# Patient Record
Sex: MALE | Race: WHITE | NOT HISPANIC OR LATINO | URBAN - METROPOLITAN AREA
[De-identification: names, ages, dates, MRNs, and addresses within clinical notes are randomized per-mention and may not be internally consistent; named-entity substitution may affect disease eponyms.]

---

## 2020-12-04 ENCOUNTER — INPATIENT (INPATIENT)
Facility: HOSPITAL | Age: 35
LOS: 1 days | Discharge: HOME | DRG: 513 | End: 2020-12-06
Attending: ORTHOPAEDIC SURGERY | Admitting: ORTHOPAEDIC SURGERY
Payer: MEDICAID

## 2020-12-04 VITALS
TEMPERATURE: 98 F | HEART RATE: 81 BPM | DIASTOLIC BLOOD PRESSURE: 71 MMHG | WEIGHT: 220.02 LBS | RESPIRATION RATE: 20 BRPM | OXYGEN SATURATION: 98 % | SYSTOLIC BLOOD PRESSURE: 107 MMHG | HEIGHT: 70 IN

## 2020-12-04 DIAGNOSIS — S61.412A LACERATION WITHOUT FOREIGN BODY OF LEFT HAND, INITIAL ENCOUNTER: ICD-10-CM

## 2020-12-04 LAB
ALBUMIN SERPL ELPH-MCNC: 3.9 G/DL — SIGNIFICANT CHANGE UP (ref 3.3–5.2)
ALP SERPL-CCNC: 57 U/L — SIGNIFICANT CHANGE UP (ref 40–120)
ALT FLD-CCNC: 49 U/L — HIGH
ANION GAP SERPL CALC-SCNC: 9 MMOL/L — SIGNIFICANT CHANGE UP (ref 5–17)
APTT BLD: 28.7 SEC — SIGNIFICANT CHANGE UP (ref 27.5–35.5)
AST SERPL-CCNC: 49 U/L — HIGH
BASOPHILS # BLD AUTO: 0.05 K/UL — SIGNIFICANT CHANGE UP (ref 0–0.2)
BASOPHILS NFR BLD AUTO: 0.4 % — SIGNIFICANT CHANGE UP (ref 0–2)
BILIRUB SERPL-MCNC: 0.6 MG/DL — SIGNIFICANT CHANGE UP (ref 0.4–2)
BUN SERPL-MCNC: 16 MG/DL — SIGNIFICANT CHANGE UP (ref 8–20)
CALCIUM SERPL-MCNC: 8.6 MG/DL — SIGNIFICANT CHANGE UP (ref 8.6–10.2)
CHLORIDE SERPL-SCNC: 105 MMOL/L — SIGNIFICANT CHANGE UP (ref 98–107)
CO2 SERPL-SCNC: 24 MMOL/L — SIGNIFICANT CHANGE UP (ref 22–29)
CREAT SERPL-MCNC: 0.74 MG/DL — SIGNIFICANT CHANGE UP (ref 0.5–1.3)
EOSINOPHIL # BLD AUTO: 0.06 K/UL — SIGNIFICANT CHANGE UP (ref 0–0.5)
EOSINOPHIL NFR BLD AUTO: 0.5 % — SIGNIFICANT CHANGE UP (ref 0–6)
GLUCOSE SERPL-MCNC: 87 MG/DL — SIGNIFICANT CHANGE UP (ref 70–99)
HCT VFR BLD CALC: 40.8 % — SIGNIFICANT CHANGE UP (ref 39–50)
HGB BLD-MCNC: 13.5 G/DL — SIGNIFICANT CHANGE UP (ref 13–17)
IMM GRANULOCYTES NFR BLD AUTO: 0.4 % — SIGNIFICANT CHANGE UP (ref 0–1.5)
INR BLD: 1.15 RATIO — SIGNIFICANT CHANGE UP (ref 0.88–1.16)
LYMPHOCYTES # BLD AUTO: 1.23 K/UL — SIGNIFICANT CHANGE UP (ref 1–3.3)
LYMPHOCYTES # BLD AUTO: 10.3 % — LOW (ref 13–44)
MCHC RBC-ENTMCNC: 29.9 PG — SIGNIFICANT CHANGE UP (ref 27–34)
MCHC RBC-ENTMCNC: 33.1 GM/DL — SIGNIFICANT CHANGE UP (ref 32–36)
MCV RBC AUTO: 90.3 FL — SIGNIFICANT CHANGE UP (ref 80–100)
MONOCYTES # BLD AUTO: 0.74 K/UL — SIGNIFICANT CHANGE UP (ref 0–0.9)
MONOCYTES NFR BLD AUTO: 6.2 % — SIGNIFICANT CHANGE UP (ref 2–14)
NEUTROPHILS # BLD AUTO: 9.8 K/UL — HIGH (ref 1.8–7.4)
NEUTROPHILS NFR BLD AUTO: 82.2 % — HIGH (ref 43–77)
PLATELET # BLD AUTO: 278 K/UL — SIGNIFICANT CHANGE UP (ref 150–400)
POTASSIUM SERPL-MCNC: 4.1 MMOL/L — SIGNIFICANT CHANGE UP (ref 3.5–5.3)
POTASSIUM SERPL-SCNC: 4.1 MMOL/L — SIGNIFICANT CHANGE UP (ref 3.5–5.3)
PROT SERPL-MCNC: 6.7 G/DL — SIGNIFICANT CHANGE UP (ref 6.6–8.7)
PROTHROM AB SERPL-ACNC: 13.2 SEC — SIGNIFICANT CHANGE UP (ref 10.6–13.6)
RAPID RVP RESULT: SIGNIFICANT CHANGE UP
RBC # BLD: 4.52 M/UL — SIGNIFICANT CHANGE UP (ref 4.2–5.8)
RBC # FLD: 12.5 % — SIGNIFICANT CHANGE UP (ref 10.3–14.5)
SARS-COV-2 RNA SPEC QL NAA+PROBE: SIGNIFICANT CHANGE UP
SODIUM SERPL-SCNC: 138 MMOL/L — SIGNIFICANT CHANGE UP (ref 135–145)
WBC # BLD: 11.93 K/UL — HIGH (ref 3.8–10.5)
WBC # FLD AUTO: 11.93 K/UL — HIGH (ref 3.8–10.5)

## 2020-12-04 PROCEDURE — 73130 X-RAY EXAM OF HAND: CPT | Mod: 26,LT

## 2020-12-04 PROCEDURE — 99285 EMERGENCY DEPT VISIT HI MDM: CPT

## 2020-12-04 RX ORDER — HYDROMORPHONE HYDROCHLORIDE 2 MG/ML
1 INJECTION INTRAMUSCULAR; INTRAVENOUS; SUBCUTANEOUS ONCE
Refills: 0 | Status: DISCONTINUED | OUTPATIENT
Start: 2020-12-04 | End: 2020-12-04

## 2020-12-04 RX ORDER — CEFAZOLIN SODIUM 1 G
2000 VIAL (EA) INJECTION ONCE
Refills: 0 | Status: COMPLETED | OUTPATIENT
Start: 2020-12-04 | End: 2020-12-04

## 2020-12-04 RX ORDER — CEFAZOLIN SODIUM 1 G
2000 VIAL (EA) INJECTION ONCE
Refills: 0 | Status: COMPLETED | OUTPATIENT
Start: 2020-12-04 | End: 2020-12-05

## 2020-12-04 RX ORDER — MORPHINE SULFATE 50 MG/1
4 CAPSULE, EXTENDED RELEASE ORAL ONCE
Refills: 0 | Status: DISCONTINUED | OUTPATIENT
Start: 2020-12-04 | End: 2020-12-04

## 2020-12-04 RX ORDER — HYDROMORPHONE HYDROCHLORIDE 2 MG/ML
0.5 INJECTION INTRAMUSCULAR; INTRAVENOUS; SUBCUTANEOUS ONCE
Refills: 0 | Status: DISCONTINUED | OUTPATIENT
Start: 2020-12-04 | End: 2020-12-04

## 2020-12-04 RX ORDER — TETANUS TOXOID, REDUCED DIPHTHERIA TOXOID AND ACELLULAR PERTUSSIS VACCINE, ADSORBED 5; 2.5; 8; 8; 2.5 [IU]/.5ML; [IU]/.5ML; UG/.5ML; UG/.5ML; UG/.5ML
0.5 SUSPENSION INTRAMUSCULAR ONCE
Refills: 0 | Status: COMPLETED | OUTPATIENT
Start: 2020-12-04 | End: 2020-12-04

## 2020-12-04 RX ADMIN — Medication 100 MILLIGRAM(S): at 21:19

## 2020-12-04 RX ADMIN — HYDROMORPHONE HYDROCHLORIDE 0.5 MILLIGRAM(S): 2 INJECTION INTRAMUSCULAR; INTRAVENOUS; SUBCUTANEOUS at 21:19

## 2020-12-04 RX ADMIN — MORPHINE SULFATE 4 MILLIGRAM(S): 50 CAPSULE, EXTENDED RELEASE ORAL at 20:21

## 2020-12-04 RX ADMIN — HYDROMORPHONE HYDROCHLORIDE 1 MILLIGRAM(S): 2 INJECTION INTRAMUSCULAR; INTRAVENOUS; SUBCUTANEOUS at 23:11

## 2020-12-04 RX ADMIN — TETANUS TOXOID, REDUCED DIPHTHERIA TOXOID AND ACELLULAR PERTUSSIS VACCINE, ADSORBED 0.5 MILLILITER(S): 5; 2.5; 8; 8; 2.5 SUSPENSION INTRAMUSCULAR at 20:22

## 2020-12-04 NOTE — ED PROVIDER NOTE - PHYSICAL EXAMINATION
Left hand: Lacerations through clearly through bone of digits 2, 3 proximal phalanx with deep tissue injury, obvious tendon disruption. Deep laceration at proximal phalanx digit 4. sensation intact distally in all digits with brisk capillary refill. Motor function distal to lacerations absent in digits 2 and 3.

## 2020-12-04 NOTE — H&P ADULT - HISTORY OF PRESENT ILLNESS
Pt Name: ANA PATEL    MRN: [id number]    Patient is a 35y Male presenting to the emergency department with a chief complaint of left hand pain. Patient reports while at work today he was using a saw and reached his hand underneath the saw table causing the stacked saw blade to strike his fingers. After the incident the patient reports he saw multiple lacerations to his left fingers and blood to his left hand and was taken to the hospital.  Patient reports full sensation to all fingers. Patient denies numbness and tingling. Patient reports isolated injury to left hand. No other orthopedic complaints at this time. Patient seen within 5 minutes of called consult. Patient seen with Dr. Weinberg at the bedside.     REVIEW OF SYSTEMS    General: Alert, responsive, in moderate to severe pain to left hand.     Skin/Breast: Left hand wrapped in gauze kerlix by ED. Bloody staining to dressing.     Musculoskeletal: SEE HPI.    Neurological: No sensory or motor changes.         PAST MEDICAL & SURGICAL HISTORY:  PAST MEDICAL & SURGICAL HISTORY:      Allergies: No Known Allergies      Medications: ceFAZolin   IVPB 2000 milliGRAM(s) IV Intermittent once  HYDROmorphone  Injectable 0.5 milliGRAM(s) IV Push Once      FAMILY HISTORY:  : non-contributory    Social History:     Ambulation: Walking independently [x] With Cane [ ] With Walker [ ]  Bedbound [ ]         Vital Signs Last 24 Hrs  T(C): 36.8 (04 Dec 2020 19:33), Max: 36.8 (04 Dec 2020 19:33)  T(F): 98.3 (04 Dec 2020 19:33), Max: 98.3 (04 Dec 2020 19:33)  HR: 81 (04 Dec 2020 19:33) (81 - 81)  BP: 107/71 (04 Dec 2020 19:33) (107/71 - 107/71)  BP(mean): --  RR: 20 (04 Dec 2020 19:33) (20 - 20)  SpO2: 98% (04 Dec 2020 19:33) (98% - 98%)    Daily Height in cm: 177.8 (04 Dec 2020 19:33)    Daily       PHYSICAL EXAM:      Appearance: Alert, responsive, in moderate to severe pain to left hand.    Neurological: Sensation is grossly intact to light touch. No focal deficits or weaknesses found.    Vascular: 2+ distal pulses. Cap refill < 2 sec. No signs of venous insufficiency or stasis. No extremity ulcerations. No cyanosis.    Musculoskeletal:         Left Upper Extremity: shoulder/elbow/wrist: no signs of trauma. NTTP. Hand: wrapped in gauze with bloody staining by ED. Gauze taken down, large lacerations to digits 2/3/4. 2: + laceration through the volar aspect of digit from the palmar crease.dorsal proximal skin intact. ROM intact to proximal aspect of digit at the MCP joint. minimal ROM to distal digit past laceration point. + bloody ooze to laceration on exam +bony show. 3rd digit: + laceration through the volar/medial aspect  of digit .ROM intact to proximal aspect of digit at the MCP joint. Minimal ROM to distal finger. 4th digit:  + laceration through the volar/medial aspect  of digit. ROM intact MCP/PIP/DIP. decreased sensation to lateral aspect of 2nd and 3rd digits distally. 5th digit: ROM intact. small laceration to distal digit less than 0.5cm. radial pulse intact. BCR to all digits. gauze and kerlix applied to hand.        Right Upper Extremity: no sings of trauma. moving extremity freely during exam.      Left Lower Extremity: no sings of trauma. moving extremity freely during exam.      Right Lower Extremity: no sings of trauma. moving extremity freely during exam.       Patient has not had something to eat since 2am this morning. Last time patient consumed liquid was over 6 hours ago as per patient.   Imaging Studies:    A/P:  Pt is a  35y Male left hand vs saw with multiple lacerations and fracture to 2/3/4th digits.     PLAN:   - admit to ortho   - Antibiotics   - pain control   - NPO for OR  - NWB LUE  - Case/images D/w  AN    36 yo male pt. (Just saw and examined the pt at 10 pm.  Table saw injury to left hand, including 2nd, 3rd and 4th finger.  Multiple fractures including 2nd, 3rd, and 4th finger proximal phalanx.  Multiple tendon injuries to index and middle finger.  Digital nerve injury to index and middle fingers.  Digital artery injury to index and middle finger.    PLAN:  Emergent surgery: To repair of fractures, vessels, nerves, and tendons.  Pt is in agreement and will plan for the procedures.  He understand there will be no 100% recovery for this severe injury, There is possibility of losing finger perioperatively. May have permanent loss of sensation, or motion. He consented for the procedure.     I have personally seen, examined, and participated in the care of this patient.  I have reviewed all pertinent clinical information, including history, physical exam, plan and the Medical/PA/NP Student’s note and agree except as noted..     I was physically present for the key portions of the evaluation and management (E/M) service provided.  I agree with the above history, physical, and plan which I have reviewed and edited where appropriate.     30 minutes spent on total encounter; more than 50% of the visit was spent counseling and/or coordinating care by the attending physician.     Plan discussed with Pt and ANGY Calderon.

## 2020-12-04 NOTE — ED PROVIDER NOTE - CLINICAL SUMMARY MEDICAL DECISION MAKING FREE TEXT BOX
Significant hand trauma from table saw blade with obvious bony and tendinous injuries. Hand appears to be vascular intact. Hand surgery consulted, will take pt to OR emergently. Given tetanus and antibiotic prophylaxis. IV analgesics as needed.

## 2020-12-04 NOTE — CONSULT NOTE ADULT - ATTENDING COMMENTS
36 yo male pt. (Just saw and examined the pt at 10 pm.  Table saw injury to left hand, including 2nd, 3rd and 4th finger.  Multiple fractures including 2nd, 3rd, and 4th finger proximal phalanx.  Multiple tendon injuries to index and middle finger.  Digital nerve injury to index and middle fingers.  Digital artery injury to index and middle finger.    PLAN:  Emergent surgery: To repair of fractures, vessels, nerves, and tendons.  Pt is in agreement and will plan for the procedures.  He understand there will be no 100% recovery for this severe injury, There is possibility of losing finger perioperatively. May have permanent loss of sensation, or motion. He consented for the procedure.

## 2020-12-04 NOTE — H&P ADULT - ATTENDING COMMENTS
34 yo male pt. (Just saw and examined the pt at 10 pm.  Table saw injury to left hand, including 2nd, 3rd and 4th finger.  Multiple fractures including 2nd, 3rd, and 4th finger proximal phalanx.  Multiple tendon injuries to index and middle finger.  Digital nerve injury to index and middle fingers.  Digital artery injury to index and middle finger.    PLAN:  Emergent surgery: To repair of fractures, vessels, nerves, and tendons.  Pt is in agreement and will plan for the procedures.  He understand there will be no 100% recovery for this severe injury, There is possibility of losing finger perioperatively. May have permanent loss of sensation, or motion. He consented for the procedure.     Dr. Weinberg signed before the surgery around 11:30 pm on 12/04/2020

## 2020-12-04 NOTE — ED ADULT TRIAGE NOTE - CHIEF COMPLAINT QUOTE
"I cut myself with the hand saw" c/o left palm laceration from hand saw @ approx 1900. Pt reports feeling lightheaded d/t pain, #18G IV by EMS to left AC, IVF infusing. Pt arrives bleeding controlled, bandage in place. MAEx4. Skin warm and dry. -bt

## 2020-12-04 NOTE — ED PROVIDER NOTE - OBJECTIVE STATEMENT
35yom no PMH sustained multiple lacerations to the left hand after reaching underneath a running table saw. Sustained injuries to the left 2nd 3rd and 4th digits. Denies any other injuries. Bandaged and given fentanyl 35yom no PMH sustained multiple lacerations to the left hand after reaching underneath a running table saw and being struck by the blade. Sustained injuries to the left 2nd 3rd and 4th digits. Denies any other injuries. Bandaged and given fentanyl by EMS.

## 2020-12-04 NOTE — CONSULT NOTE ADULT - SUBJECTIVE AND OBJECTIVE BOX
Pt Name: ANA PATEL    MRN: [id number]    Patient is a 35y Male presenting to the emergency department with a chief complaint of left hand pain. Patient reports while at work today he was using a saw and reached his hand underneath the saw table causing the stacked saw blade to strike his fingers. After the incident the patient reports he saw multiple lacerations to his left fingers and blood to his left hand and was taken to the hospital.  Patient reports full sensation to all fingers. Patient denies numbness and tingling. Patient reports isolated injury to left hand. No other orthopedic complaints at this time. Patient seen within 5 minutes of called consult    REVIEW OF SYSTEMS    General: Alert, responsive, in moderate to severe pain to left hand.     Skin/Breast: Left hand wrapped in gauze kerlix by ED. Bloody staining to dressing.     Musculoskeletal: SEE HPI.    Neurological: No sensory or motor changes.         PAST MEDICAL & SURGICAL HISTORY:  PAST MEDICAL & SURGICAL HISTORY:      Allergies: No Known Allergies      Medications: ceFAZolin   IVPB 2000 milliGRAM(s) IV Intermittent once  HYDROmorphone  Injectable 0.5 milliGRAM(s) IV Push Once      FAMILY HISTORY:  : non-contributory    Social History:     Ambulation: Walking independently [x] With Cane [ ] With Walker [ ]  Bedbound [ ]         Vital Signs Last 24 Hrs  T(C): 36.8 (04 Dec 2020 19:33), Max: 36.8 (04 Dec 2020 19:33)  T(F): 98.3 (04 Dec 2020 19:33), Max: 98.3 (04 Dec 2020 19:33)  HR: 81 (04 Dec 2020 19:33) (81 - 81)  BP: 107/71 (04 Dec 2020 19:33) (107/71 - 107/71)  BP(mean): --  RR: 20 (04 Dec 2020 19:33) (20 - 20)  SpO2: 98% (04 Dec 2020 19:33) (98% - 98%)    Daily Height in cm: 177.8 (04 Dec 2020 19:33)    Daily       PHYSICAL EXAM:      Appearance: Alert, responsive, in moderate to severe pain to left hand.    Neurological: Sensation is grossly intact to light touch. No focal deficits or weaknesses found.    Skin: no rash on visible skin. Skin is clean, dry and intact. No bleeding. No abrasions. No ulcerations.    Vascular: 2+ distal pulses. Cap refill < 2 sec. No signs of venous insufficiency or stasis. No extremity ulcerations. No cyanosis.    Musculoskeletal:         Left Upper Extremity: shoulder/elbow/wrist: no signs of trauma. NTTP. Hand: wrapped in gauze with bloody staining by ED. will evaluate further after xrays and pain medication is administered.        Right Upper Extremity: no sings of trauma. moving extremity freely during exam.        Left Lower Extremity: no sings of trauma. moving extremity freely during exam.      Right Lower Extremity: no sings of trauma. moving extremity freely during exam.     Imaging Studies:    A/P:  Pt is a  35y Male with    found to have    PLAN:   - patient currently in xray for left hand xrays   - rest of plan pending images Pt Name: ANA PATEL    MRN: [id number]    Patient is a 35y Male presenting to the emergency department with a chief complaint of left hand pain. Patient reports while at work today he was using a saw and reached his hand underneath the saw table causing the stacked saw blade to strike his fingers. After the incident the patient reports he saw multiple lacerations to his left fingers and blood to his left hand and was taken to the hospital.  Patient reports full sensation to all fingers. Patient denies numbness and tingling. Patient reports isolated injury to left hand. No other orthopedic complaints at this time. Patient seen within 5 minutes of called consult. Patient seen with Dr. Mina at the bedside.     REVIEW OF SYSTEMS    General: Alert, responsive, in moderate to severe pain to left hand.     Skin/Breast: Left hand wrapped in gauze kerlix by ED. Bloody staining to dressing.     Musculoskeletal: SEE HPI.    Neurological: No sensory or motor changes.         PAST MEDICAL & SURGICAL HISTORY:  PAST MEDICAL & SURGICAL HISTORY:      Allergies: No Known Allergies      Medications: ceFAZolin   IVPB 2000 milliGRAM(s) IV Intermittent once  HYDROmorphone  Injectable 0.5 milliGRAM(s) IV Push Once      FAMILY HISTORY:  : non-contributory    Social History:     Ambulation: Walking independently [x] With Cane [ ] With Walker [ ]  Bedbound [ ]         Vital Signs Last 24 Hrs  T(C): 36.8 (04 Dec 2020 19:33), Max: 36.8 (04 Dec 2020 19:33)  T(F): 98.3 (04 Dec 2020 19:33), Max: 98.3 (04 Dec 2020 19:33)  HR: 81 (04 Dec 2020 19:33) (81 - 81)  BP: 107/71 (04 Dec 2020 19:33) (107/71 - 107/71)  BP(mean): --  RR: 20 (04 Dec 2020 19:33) (20 - 20)  SpO2: 98% (04 Dec 2020 19:33) (98% - 98%)    Daily Height in cm: 177.8 (04 Dec 2020 19:33)    Daily       PHYSICAL EXAM:      Appearance: Alert, responsive, in moderate to severe pain to left hand.    Neurological: Sensation is grossly intact to light touch. No focal deficits or weaknesses found.    Vascular: 2+ distal pulses. Cap refill < 2 sec. No signs of venous insufficiency or stasis. No extremity ulcerations. No cyanosis.    Musculoskeletal:         Left Upper Extremity: shoulder/elbow/wrist: no signs of trauma. NTTP. Hand: wrapped in gauze with bloody staining by ED. Gauze taken down, large lacerations to digits 2/3/4. 2: + laceration through the volar aspect of digit from the palmar crease.dorsal proximal skin intact. ROM intact to proximal aspect of digit at the MCP joint. minimal ROM to distal digit past laceration point. + bloody ooze to laceration on exam +bony show. 3rd digit: + laceration through the volar/medial aspect  of digit .ROM intact to proximal aspect of digit at the MCP joint. Minimal ROM to distal finger. 4th digit:  + laceration through the volar/medial aspect  of digit. ROM intact MCP/PIP/DIP. decreased sensation to lateral aspect of 2nd and 3rd digits distally. 5th digit: ROM intact. small laceration to distal digit less than 0.5cm. radial pulse intact. BCR to all digits. gauze and kerlix applied to hand.        Right Upper Extremity: no sings of trauma. moving extremity freely during exam.      Left Lower Extremity: no sings of trauma. moving extremity freely during exam.      Right Lower Extremity: no sings of trauma. moving extremity freely during exam.     Imaging Studies:    A/P:  Pt is a  35y Male left hand vs saw with multiple lacerations and fracture to 2/3/4th digits.     PLAN:   - Antibiotics   - pain control   - NPO for OR  - NWB LUE  - Case/images D/w Dr. MINA Pt Name: ANA PATEL    MRN: [id number]    Patient is a 35y Male presenting to the emergency department with a chief complaint of left hand pain. Patient reports while at work today he was using a saw and reached his hand underneath the saw table causing the stacked saw blade to strike his fingers. After the incident the patient reports he saw multiple lacerations to his left fingers and blood to his left hand and was taken to the hospital.  Patient reports full sensation to all fingers. Patient denies numbness and tingling. Patient reports isolated injury to left hand. No other orthopedic complaints at this time. Patient seen within 5 minutes of called consult. Patient seen with Dr. Mnia at the bedside.     REVIEW OF SYSTEMS    General: Alert, responsive, in moderate to severe pain to left hand.     Skin/Breast: Left hand wrapped in gauze kerlix by ED. Bloody staining to dressing.     Musculoskeletal: SEE HPI.    Neurological: No sensory or motor changes.         PAST MEDICAL & SURGICAL HISTORY:  PAST MEDICAL & SURGICAL HISTORY:      Allergies: No Known Allergies      Medications: ceFAZolin   IVPB 2000 milliGRAM(s) IV Intermittent once  HYDROmorphone  Injectable 0.5 milliGRAM(s) IV Push Once      FAMILY HISTORY:  : non-contributory    Social History:     Ambulation: Walking independently [x] With Cane [ ] With Walker [ ]  Bedbound [ ]         Vital Signs Last 24 Hrs  T(C): 36.8 (04 Dec 2020 19:33), Max: 36.8 (04 Dec 2020 19:33)  T(F): 98.3 (04 Dec 2020 19:33), Max: 98.3 (04 Dec 2020 19:33)  HR: 81 (04 Dec 2020 19:33) (81 - 81)  BP: 107/71 (04 Dec 2020 19:33) (107/71 - 107/71)  BP(mean): --  RR: 20 (04 Dec 2020 19:33) (20 - 20)  SpO2: 98% (04 Dec 2020 19:33) (98% - 98%)    Daily Height in cm: 177.8 (04 Dec 2020 19:33)    Daily       PHYSICAL EXAM:      Appearance: Alert, responsive, in moderate to severe pain to left hand.    Neurological: Sensation is grossly intact to light touch. No focal deficits or weaknesses found.    Vascular: 2+ distal pulses. Cap refill < 2 sec. No signs of venous insufficiency or stasis. No extremity ulcerations. No cyanosis.    Musculoskeletal:         Left Upper Extremity: shoulder/elbow/wrist: no signs of trauma. NTTP. Hand: wrapped in gauze with bloody staining by ED. Gauze taken down, large lacerations to digits 2/3/4. 2: + laceration through the volar aspect of digit from the palmar crease.dorsal proximal skin intact. ROM intact to proximal aspect of digit at the MCP joint. minimal ROM to distal digit past laceration point. + bloody ooze to laceration on exam +bony show. 3rd digit: + laceration through the volar/medial aspect  of digit .ROM intact to proximal aspect of digit at the MCP joint. Minimal ROM to distal finger. 4th digit:  + laceration through the volar/medial aspect  of digit. ROM intact MCP/PIP/DIP. decreased sensation to lateral aspect of 2nd and 3rd digits distally. 5th digit: ROM intact. small laceration to distal digit less than 0.5cm. radial pulse intact. BCR to all digits. gauze and kerlix applied to hand.        Right Upper Extremity: no sings of trauma. moving extremity freely during exam.      Left Lower Extremity: no sings of trauma. moving extremity freely during exam.      Right Lower Extremity: no sings of trauma. moving extremity freely during exam.       Patient has not had something to eat since 2am this morning. Last time patient consumed liquid was over 6 hours ago as per patient.   Imaging Studies:    A/P:  Pt is a  35y Male left hand vs saw with multiple lacerations and fracture to 2/3/4th digits.     PLAN:   - admit to ortho   - Antibiotics   - pain control   - NPO for OR  - NWB LUE  - Case/images D/w Dr. MINA Pt Name: ANA PATEL    MRN: [id number]    Patient is a 35y Male presenting to the emergency department with a chief complaint of left hand pain. Patient reports while at work today he was using a saw and reached his hand underneath the saw table causing the stacked saw blade to strike his fingers. After the incident the patient reports he saw multiple lacerations to his left fingers and blood to his left hand and was taken to the hospital.  Patient reports full sensation to all fingers. Patient denies numbness and tingling. Patient reports isolated injury to left hand. No other orthopedic complaints at this time. Patient seen within 5 minutes of called consult.     REVIEW OF SYSTEMS    General: Alert, responsive, in moderate to severe pain to left hand.     Skin/Breast: Left hand wrapped in gauze kerlix by ED. Bloody staining to dressing.     Musculoskeletal: SEE HPI.    Neurological: No sensory or motor changes.         PAST MEDICAL & SURGICAL HISTORY:  PAST MEDICAL & SURGICAL HISTORY:      Allergies: No Known Allergies      Medications: ceFAZolin   IVPB 2000 milliGRAM(s) IV Intermittent once  HYDROmorphone  Injectable 0.5 milliGRAM(s) IV Push Once      FAMILY HISTORY:  : non-contributory    Social History:     Ambulation: Walking independently [x] With Cane [ ] With Walker [ ]  Bedbound [ ]         Vital Signs Last 24 Hrs  T(C): 36.8 (04 Dec 2020 19:33), Max: 36.8 (04 Dec 2020 19:33)  T(F): 98.3 (04 Dec 2020 19:33), Max: 98.3 (04 Dec 2020 19:33)  HR: 81 (04 Dec 2020 19:33) (81 - 81)  BP: 107/71 (04 Dec 2020 19:33) (107/71 - 107/71)  BP(mean): --  RR: 20 (04 Dec 2020 19:33) (20 - 20)  SpO2: 98% (04 Dec 2020 19:33) (98% - 98%)    Daily Height in cm: 177.8 (04 Dec 2020 19:33)    Daily       PHYSICAL EXAM:      Appearance: Alert, responsive, in moderate to severe pain to left hand.    Neurological: Sensation is grossly intact to light touch. No focal deficits or weaknesses found.    Vascular: 2+ distal pulses. Cap refill < 2 sec. No signs of venous insufficiency or stasis. No extremity ulcerations. No cyanosis.    Musculoskeletal:         Left Upper Extremity: shoulder/elbow/wrist: no signs of trauma. NTTP. Hand: wrapped in gauze with bloody staining by ED. Gauze taken down, large lacerations to digits 2/3/4. 2: + laceration through the volar aspect of digit from the palmar crease.dorsal proximal skin intact. ROM intact to proximal aspect of digit at the MCP joint. minimal ROM to distal digit past laceration point. + bloody ooze to laceration on exam +bony show. 3rd digit: + laceration through the volar/medial aspect  of digit .ROM intact to proximal aspect of digit at the MCP joint. Minimal ROM to distal finger. 4th digit:  + laceration through the volar/medial aspect  of digit. ROM intact MCP/PIP/DIP. decreased sensation to lateral aspect of 2nd and 3rd digits distally. 5th digit: ROM intact. small laceration to distal digit less than 0.5cm. radial pulse intact. BCR to all digits. gauze and kerlix applied to hand.        Right Upper Extremity: no sings of trauma. moving extremity freely during exam.      Left Lower Extremity: no sings of trauma. moving extremity freely during exam.      Right Lower Extremity: no sings of trauma. moving extremity freely during exam.       Patient has not had something to eat since 2am this morning. Last time patient consumed liquid was over 6 hours ago as per patient.   Imaging Studies:    A/P:  Pt is a  35y Male left hand vs saw with multiple lacerations and fracture to 2/3/4th digits.     PLAN:   - admit to ortho   - Antibiotics   - pain control   - NPO for OR  - NWB LUE  - Case/images D/w  AN    DIGITAL BLOCK to LEFT fingers digits 2-5    Performed by: Caroline Rich PA-C    Consent: the risks and benefits of laceration discussed with patient, including the risk of bleeding, infection, and technical failure. The risks of not performing the procedure, including infection and large disfiguring scar, were discussed with the patient. The alternatives of performing the procedure also discussed. Verbal emergent consent was obtained following the discussion.    Universal Protocol: A time out was performed and the correct patient and site were verified.    The left hand was prepped and draped in proper sterile fashion and the digital blocks were performed. The overlying skin was anesthetized with 10 ml of 1% lidocaine. Unable to tolerate washout due to pain. Patient being taken to OR for operative treatment.  Complications: None Pt Name: ANA PATEL    MRN: [id number]    Patient is a 35y Male presenting to the emergency department with a chief complaint of left hand pain. Patient reports while at work today he was using a saw and reached his hand underneath the saw table causing the stacked saw blade to strike his fingers. After the incident the patient reports he saw multiple lacerations to his left fingers and blood to his left hand and was taken to the hospital.  Patient reports full sensation to all fingers. Patient denies numbness and tingling. Patient reports isolated injury to left hand. No other orthopedic complaints at this time. Patient seen within 5 minutes of called consult.     REVIEW OF SYSTEMS    General: Alert, responsive, in moderate to severe pain to left hand.     Skin/Breast: Left hand wrapped in gauze kerlix by ED. Bloody staining to dressing.     Musculoskeletal: SEE HPI.    Neurological: No sensory or motor changes.         PAST MEDICAL & SURGICAL HISTORY:  PAST MEDICAL & SURGICAL HISTORY:      Allergies: No Known Allergies      Medications: ceFAZolin   IVPB 2000 milliGRAM(s) IV Intermittent once  HYDROmorphone  Injectable 0.5 milliGRAM(s) IV Push Once      FAMILY HISTORY:  : non-contributory    Social History:     Ambulation: Walking independently [x] With Cane [ ] With Walker [ ]  Bedbound [ ]         Vital Signs Last 24 Hrs  T(C): 36.8 (04 Dec 2020 19:33), Max: 36.8 (04 Dec 2020 19:33)  T(F): 98.3 (04 Dec 2020 19:33), Max: 98.3 (04 Dec 2020 19:33)  HR: 81 (04 Dec 2020 19:33) (81 - 81)  BP: 107/71 (04 Dec 2020 19:33) (107/71 - 107/71)  BP(mean): --  RR: 20 (04 Dec 2020 19:33) (20 - 20)  SpO2: 98% (04 Dec 2020 19:33) (98% - 98%)    Daily Height in cm: 177.8 (04 Dec 2020 19:33)    Daily       PHYSICAL EXAM:      Appearance: Alert, responsive, in moderate to severe pain to left hand.    Neurological: No focal deficits or weaknesses found.    Vascular: 2+ distal pulses. Cap refill < 2 sec. No signs of venous insufficiency or stasis. No extremity ulcerations. No cyanosis.    Musculoskeletal:         Left Upper Extremity: shoulder/elbow/wrist: no signs of trauma. NTTP. Hand: wrapped in gauze with bloody staining by ED. Gauze taken down, large lacerations to digits 2/3/4. 2: + laceration through the volar aspect of digit from the palmar crease.dorsal proximal skin intact. ROM intact to proximal aspect of digit at the MCP joint. minimal ROM to distal digit past laceration point. + bloody ooze to laceration on exam +bony show. 3rd digit: + laceration through the volar/medial aspect  of digit .ROM intact to proximal aspect of digit at the MCP joint. Minimal ROM to distal finger. 4th digit:  + laceration through the volar/medial aspect  of digit. ROM intact MCP/PIP/DIP. decreased sensation to lateral aspect of 2nd and 3rd digits distally. 5th digit: ROM intact. small laceration to distal digit less than 0.5cm. radial pulse intact. BCR to all digits. gauze and kerlix applied to hand.        Right Upper Extremity: no sings of trauma. moving extremity freely during exam.      Left Lower Extremity: no sings of trauma. moving extremity freely during exam.      Right Lower Extremity: no sings of trauma. moving extremity freely during exam.       Patient has not had something to eat since 2am this morning. Last time patient consumed liquid was over 6 hours ago as per patient.   Imaging Studies:  official read pending      A/P:  Pt is a  35y Male left hand vs saw with multiple lacerations and fracture to 2/3/4th digits.     PLAN:   - admit to ortho   - Antibiotics   - pain control   - NPO for OR  - NWB LUE  - Case/images D/w  AN    DIGITAL BLOCK to LEFT fingers digits 2-5    Performed by: Caroline Rich PA-C    Consent: the risks and benefits of laceration discussed with patient, including the risk of bleeding, infection, and technical failure. The risks of not performing the procedure, including infection and large disfiguring scar, were discussed with the patient. The alternatives of performing the procedure also discussed. Verbal emergent consent was obtained following the discussion.    Universal Protocol: A time out was performed and the correct patient and site were verified.    The left hand was prepped and draped in proper sterile fashion and the digital blocks were performed. The overlying skin was anesthetized with 10 ml of 1% lidocaine. Unable to tolerate washout due to pain. Patient being taken to OR for operative treatment.  Complications: None

## 2020-12-05 LAB — BLD GP AB SCN SERPL QL: SIGNIFICANT CHANGE UP

## 2020-12-05 RX ORDER — ONDANSETRON 8 MG/1
4 TABLET, FILM COATED ORAL ONCE
Refills: 0 | Status: DISCONTINUED | OUTPATIENT
Start: 2020-12-05 | End: 2020-12-05

## 2020-12-05 RX ORDER — OXYCODONE HYDROCHLORIDE 5 MG/1
5 TABLET ORAL
Refills: 0 | Status: DISCONTINUED | OUTPATIENT
Start: 2020-12-05 | End: 2020-12-06

## 2020-12-05 RX ORDER — SODIUM CHLORIDE 9 MG/ML
1000 INJECTION, SOLUTION INTRAVENOUS
Refills: 0 | Status: DISCONTINUED | OUTPATIENT
Start: 2020-12-05 | End: 2020-12-05

## 2020-12-05 RX ORDER — HYDROMORPHONE HYDROCHLORIDE 2 MG/ML
0.5 INJECTION INTRAMUSCULAR; INTRAVENOUS; SUBCUTANEOUS
Refills: 0 | Status: DISCONTINUED | OUTPATIENT
Start: 2020-12-05 | End: 2020-12-05

## 2020-12-05 RX ORDER — ACETAMINOPHEN 500 MG
975 TABLET ORAL EVERY 8 HOURS
Refills: 0 | Status: DISCONTINUED | OUTPATIENT
Start: 2020-12-05 | End: 2020-12-06

## 2020-12-05 RX ORDER — KETOROLAC TROMETHAMINE 30 MG/ML
15 SYRINGE (ML) INJECTION EVERY 6 HOURS
Refills: 0 | Status: DISCONTINUED | OUTPATIENT
Start: 2020-12-05 | End: 2020-12-05

## 2020-12-05 RX ORDER — OXYCODONE HYDROCHLORIDE 5 MG/1
10 TABLET ORAL
Refills: 0 | Status: DISCONTINUED | OUTPATIENT
Start: 2020-12-05 | End: 2020-12-06

## 2020-12-05 RX ORDER — MAGNESIUM HYDROXIDE 400 MG/1
30 TABLET, CHEWABLE ORAL DAILY
Refills: 0 | Status: DISCONTINUED | OUTPATIENT
Start: 2020-12-05 | End: 2020-12-06

## 2020-12-05 RX ORDER — ONDANSETRON 8 MG/1
4 TABLET, FILM COATED ORAL EVERY 6 HOURS
Refills: 0 | Status: DISCONTINUED | OUTPATIENT
Start: 2020-12-05 | End: 2020-12-06

## 2020-12-05 RX ORDER — HYDROMORPHONE HYDROCHLORIDE 2 MG/ML
0.5 INJECTION INTRAMUSCULAR; INTRAVENOUS; SUBCUTANEOUS
Refills: 0 | Status: DISCONTINUED | OUTPATIENT
Start: 2020-12-05 | End: 2020-12-06

## 2020-12-05 RX ORDER — CEFAZOLIN SODIUM 1 G
2000 VIAL (EA) INJECTION EVERY 8 HOURS
Refills: 0 | Status: DISCONTINUED | OUTPATIENT
Start: 2020-12-05 | End: 2020-12-06

## 2020-12-05 RX ORDER — SENNA PLUS 8.6 MG/1
2 TABLET ORAL AT BEDTIME
Refills: 0 | Status: DISCONTINUED | OUTPATIENT
Start: 2020-12-05 | End: 2020-12-06

## 2020-12-05 RX ORDER — HYDROMORPHONE HYDROCHLORIDE 2 MG/ML
4 INJECTION INTRAMUSCULAR; INTRAVENOUS; SUBCUTANEOUS
Refills: 0 | Status: DISCONTINUED | OUTPATIENT
Start: 2020-12-05 | End: 2020-12-06

## 2020-12-05 RX ORDER — SODIUM CHLORIDE 9 MG/ML
1000 INJECTION, SOLUTION INTRAVENOUS
Refills: 0 | Status: DISCONTINUED | OUTPATIENT
Start: 2020-12-05 | End: 2020-12-06

## 2020-12-05 RX ADMIN — Medication 100 MILLIGRAM(S): at 18:30

## 2020-12-05 RX ADMIN — SODIUM CHLORIDE 150 MILLILITER(S): 9 INJECTION, SOLUTION INTRAVENOUS at 04:37

## 2020-12-05 RX ADMIN — OXYCODONE HYDROCHLORIDE 10 MILLIGRAM(S): 5 TABLET ORAL at 04:36

## 2020-12-05 RX ADMIN — HYDROMORPHONE HYDROCHLORIDE 4 MILLIGRAM(S): 2 INJECTION INTRAMUSCULAR; INTRAVENOUS; SUBCUTANEOUS at 23:00

## 2020-12-05 RX ADMIN — Medication 100 MILLIGRAM(S): at 09:17

## 2020-12-05 RX ADMIN — Medication 15 MILLIGRAM(S): at 23:13

## 2020-12-05 RX ADMIN — HYDROMORPHONE HYDROCHLORIDE 4 MILLIGRAM(S): 2 INJECTION INTRAMUSCULAR; INTRAVENOUS; SUBCUTANEOUS at 15:56

## 2020-12-05 RX ADMIN — HYDROMORPHONE HYDROCHLORIDE 0.5 MILLIGRAM(S): 2 INJECTION INTRAMUSCULAR; INTRAVENOUS; SUBCUTANEOUS at 04:14

## 2020-12-05 RX ADMIN — Medication 15 MILLIGRAM(S): at 12:56

## 2020-12-05 RX ADMIN — Medication 100 MILLIGRAM(S): at 00:02

## 2020-12-05 RX ADMIN — HYDROMORPHONE HYDROCHLORIDE 0.5 MILLIGRAM(S): 2 INJECTION INTRAMUSCULAR; INTRAVENOUS; SUBCUTANEOUS at 04:01

## 2020-12-05 RX ADMIN — Medication 975 MILLIGRAM(S): at 21:46

## 2020-12-05 RX ADMIN — SODIUM CHLORIDE 150 MILLILITER(S): 9 INJECTION, SOLUTION INTRAVENOUS at 15:29

## 2020-12-05 RX ADMIN — HYDROMORPHONE HYDROCHLORIDE 0.5 MILLIGRAM(S): 2 INJECTION INTRAMUSCULAR; INTRAVENOUS; SUBCUTANEOUS at 11:14

## 2020-12-05 RX ADMIN — Medication 15 MILLIGRAM(S): at 12:26

## 2020-12-05 RX ADMIN — Medication 975 MILLIGRAM(S): at 18:00

## 2020-12-05 RX ADMIN — Medication 15 MILLIGRAM(S): at 18:00

## 2020-12-05 RX ADMIN — Medication 15 MILLIGRAM(S): at 18:29

## 2020-12-05 RX ADMIN — OXYCODONE HYDROCHLORIDE 10 MILLIGRAM(S): 5 TABLET ORAL at 04:37

## 2020-12-05 RX ADMIN — HYDROMORPHONE HYDROCHLORIDE 4 MILLIGRAM(S): 2 INJECTION INTRAMUSCULAR; INTRAVENOUS; SUBCUTANEOUS at 09:38

## 2020-12-05 RX ADMIN — HYDROMORPHONE HYDROCHLORIDE 4 MILLIGRAM(S): 2 INJECTION INTRAMUSCULAR; INTRAVENOUS; SUBCUTANEOUS at 15:26

## 2020-12-05 RX ADMIN — HYDROMORPHONE HYDROCHLORIDE 4 MILLIGRAM(S): 2 INJECTION INTRAMUSCULAR; INTRAVENOUS; SUBCUTANEOUS at 21:46

## 2020-12-05 RX ADMIN — Medication 975 MILLIGRAM(S): at 05:31

## 2020-12-05 RX ADMIN — SENNA PLUS 2 TABLET(S): 8.6 TABLET ORAL at 21:46

## 2020-12-05 RX ADMIN — Medication 15 MILLIGRAM(S): at 05:31

## 2020-12-05 RX ADMIN — Medication 100 MILLIGRAM(S): at 23:13

## 2020-12-05 RX ADMIN — Medication 15 MILLIGRAM(S): at 00:17

## 2020-12-05 RX ADMIN — Medication 975 MILLIGRAM(S): at 00:22

## 2020-12-05 RX ADMIN — HYDROMORPHONE HYDROCHLORIDE 0.5 MILLIGRAM(S): 2 INJECTION INTRAMUSCULAR; INTRAVENOUS; SUBCUTANEOUS at 10:44

## 2020-12-05 RX ADMIN — Medication 975 MILLIGRAM(S): at 18:28

## 2020-12-05 NOTE — PHYSICAL THERAPY INITIAL EVALUATION ADULT - DID THE PATIENT HAVE SURGERY?
s/p Repair, extensor tendon, hand, Repair of flexor tendon of finger of left hand, ORIF fracture of proximal phalanx of left middle finger/yes

## 2020-12-05 NOTE — OCCUPATIONAL THERAPY INITIAL EVALUATION ADULT - LEVEL OF INDEPENDENCE: EATING, OT EVAL
modified independent - with increased time and effort.  Pt uses mouth or legs to help stabilize task items requiring bilateral coordination.  with Increased time and effort but pt compensates independently.

## 2020-12-05 NOTE — PHYSICAL THERAPY INITIAL EVALUATION ADULT - MANUAL MUSCLE TESTING RESULTS, REHAB EVAL
except for Left hand 2/2 to NWB/no strength deficits were identified no strength deficits were identified/(+) dressing

## 2020-12-05 NOTE — OCCUPATIONAL THERAPY INITIAL EVALUATION ADULT - PERTINENT HX OF CURRENT PROBLEM, REHAB EVAL
s/p Repair, extensor tendon, hand, Repair of flexor tendon of finger of left hand, ORIF fracture of proximal phalanx of left middle finger after sustaining a tablesaw injury

## 2020-12-05 NOTE — OCCUPATIONAL THERAPY INITIAL EVALUATION ADULT - LEVEL OF INDEPENDENCE, OT EVAL
modified independent - pt reports he will cover left UE and use hand held shower.  He does not anticipate any problem, just awkwardness.

## 2020-12-05 NOTE — PHYSICAL THERAPY INITIAL EVALUATION ADULT - GENERAL OBSERVATIONS, REHAB EVAL
Pt received in semi patton position in bed. NAD and agreeable to PT evaluation. (+) IV lock and (+) Dressing to Left hand C/D/I

## 2020-12-05 NOTE — PHYSICAL THERAPY INITIAL EVALUATION ADULT - PERTINENT HX OF CURRENT PROBLEM, REHAB EVAL
Pt is a 34y/o M who presented to [facility] s/p left  hand injury. (+) fractures 2nd, 3rd, 4th digits. (+) tendon injury Left FDP & FDS

## 2020-12-05 NOTE — PHYSICAL THERAPY INITIAL EVALUATION ADULT - ADDITIONAL COMMENTS
Pt states living in a house alone. 2 AMBROSIO to get into the house (+) rail and no AMBROSIO within the house. Pt is Right hand dominant. Pt doesn't own no DME and is independent prior admission. PT states that he drives. Pt states living in a house alone. 2 steps to get into the house (+) Luan handrail, no steps within the house. Pt is right hand dominant. Pt denies DME and is independent prior admission. PT states that he drives.

## 2020-12-05 NOTE — BRIEF OPERATIVE NOTE - NSICDXBRIEFPROCEDURE_GEN_ALL_CORE_FT
PROCEDURES:  Repair, extensor tendon, hand 05-Dec-2020 03:17:12  Clifford Weinberg  Repair of flexor tendon of finger of left hand 05-Dec-2020 03:16:38  Clifford Weinberg  ORIF fracture of proximal phalanx of left middle finger 05-Dec-2020 03:11:38  Clifford Weinberg

## 2020-12-05 NOTE — PHYSICAL THERAPY INITIAL EVALUATION ADULT - DIAGNOSIS, PT EVAL
Pt at his baseline, no inpatient PT skilled needed at this time. Pt at his baseline, no inpatient PT needs at this time.

## 2020-12-05 NOTE — OCCUPATIONAL THERAPY INITIAL EVALUATION ADULT - REHAB POTENTIAL, OT EVAL
none at this time.  Once medically appropriate for ROM/therex, pt will benefit from OT hand therapy.

## 2020-12-05 NOTE — PROGRESS NOTE ADULT - SUBJECTIVE AND OBJECTIVE BOX
Patient seen and eval at bedside. Patient does have pain left hand however is controlled with pain medication. Patient has no other complaints. Denies CP, SOB, dizziness.    Vital Signs Last 24 Hrs  T(C): 36.5 (05 Dec 2020 05:29), Max: 37.2 (05 Dec 2020 04:22)  T(F): 97.7 (05 Dec 2020 05:29), Max: 98.9 (05 Dec 2020 04:22)  HR: 99 (05 Dec 2020 05:29) (78 - 100)  BP: 117/72 (05 Dec 2020 05:29) (107/65 - 132/69)  BP(mean): 86 (05 Dec 2020 04:37) (77 - 86)  RR: 18 (05 Dec 2020 05:29) (11 - 20)  SpO2: 95% (05 Dec 2020 05:29) (95% - 99%)    PE: NAD, alert awake  Left UE: splint/dressing C/D/I, no bleeding or drainage noted  Able to wiggle tips of fingers slightly 2-5. Thumb unable to assess due to splint and dressing coverage  Cap refill brisk 2-5 < 2 sec, SILT distally 2-5      A/P: 36 yo M s/p Repair, extensor tendon, hand, Repair of flexor tendon of finger of left hand, ORIF fracture of proximal phalanx of left middle finger POD#0    ·	Pain control  ·	DVT propx: SCDs, patient ambulatory - PT at bedside  ·	NWB left UE  ·	Dressing change tomorrow with Dr. Weinberg  ·	Cont IV abx  ·	Elevation left UE

## 2020-12-05 NOTE — OCCUPATIONAL THERAPY INITIAL EVALUATION ADULT - COORDINATION ASSESSED, REHAB EVAL
finger to nose/right UE intact.  Left UE grossly intact. right UE intact.  Left UE grossly intact./finger to nose

## 2020-12-05 NOTE — OCCUPATIONAL THERAPY INITIAL EVALUATION ADULT - RANGE OF MOTION EXAMINATION, UPPER EXTREMITY
Left shoulder, elbow and forearm WFL actively.  Left wrist, hand and digits immobilized by splint./Right UE Active ROM was WNL (within normal limits)

## 2020-12-05 NOTE — BRIEF OPERATIVE NOTE - NSICDXBRIEFPOSTOP_GEN_ALL_CORE_FT
POST-OP DIAGNOSIS:  Laceration of left hand involving tendon 05-Dec-2020 03:15:27  Clifford Weinberg  Fracture of proximal phalanx of left hand 05-Dec-2020 03:14:36  Clifford Weinberg

## 2020-12-05 NOTE — BRIEF OPERATIVE NOTE - OPERATION/FINDINGS
Left hand open fractures of the 2nd 3rd and 4th proximal phalanx.  Extensor tendon injury to left 2nd, 3rd, and 4th finger  Flexor tendon laceration left middle finger, both the FDP and FDS.

## 2020-12-05 NOTE — OCCUPATIONAL THERAPY INITIAL EVALUATION ADULT - ADDITIONAL COMMENTS
Pt lives in private home with 2 steps, bilateral HRs to enter.  Once inside there are no stairs to manage.   Pt drives.  He has no medical equipment.

## 2020-12-05 NOTE — PHYSICAL THERAPY INITIAL EVALUATION ADULT - ACTIVE RANGE OF MOTION EXAMINATION, REHAB EVAL
except Left hand 2/2 to NWB/no Active ROM deficits were identified no Active ROM deficits were identified/(+) dressing

## 2020-12-05 NOTE — BRIEF OPERATIVE NOTE - NSICDXBRIEFPREOP_GEN_ALL_CORE_FT
PRE-OP DIAGNOSIS:  Laceration of left hand involving tendon 05-Dec-2020 03:15:02  Clifford Weinberg  Proximal phalanx fracture of finger 05-Dec-2020 03:14:11  Clifford Weinberg

## 2020-12-06 VITALS
DIASTOLIC BLOOD PRESSURE: 76 MMHG | TEMPERATURE: 98 F | OXYGEN SATURATION: 99 % | HEART RATE: 88 BPM | RESPIRATION RATE: 18 BRPM | SYSTOLIC BLOOD PRESSURE: 122 MMHG

## 2020-12-06 LAB
ANION GAP SERPL CALC-SCNC: 7 MMOL/L — SIGNIFICANT CHANGE UP (ref 5–17)
BASOPHILS # BLD AUTO: 0.04 K/UL — SIGNIFICANT CHANGE UP (ref 0–0.2)
BASOPHILS NFR BLD AUTO: 0.6 % — SIGNIFICANT CHANGE UP (ref 0–2)
BUN SERPL-MCNC: 13 MG/DL — SIGNIFICANT CHANGE UP (ref 8–20)
CALCIUM SERPL-MCNC: 7.9 MG/DL — LOW (ref 8.6–10.2)
CHLORIDE SERPL-SCNC: 108 MMOL/L — HIGH (ref 98–107)
CO2 SERPL-SCNC: 26 MMOL/L — SIGNIFICANT CHANGE UP (ref 22–29)
CREAT SERPL-MCNC: 0.7 MG/DL — SIGNIFICANT CHANGE UP (ref 0.5–1.3)
EOSINOPHIL # BLD AUTO: 0.13 K/UL — SIGNIFICANT CHANGE UP (ref 0–0.5)
EOSINOPHIL NFR BLD AUTO: 1.9 % — SIGNIFICANT CHANGE UP (ref 0–6)
GLUCOSE SERPL-MCNC: 92 MG/DL — SIGNIFICANT CHANGE UP (ref 70–99)
HCT VFR BLD CALC: 35.8 % — LOW (ref 39–50)
HGB BLD-MCNC: 11.5 G/DL — LOW (ref 13–17)
IMM GRANULOCYTES NFR BLD AUTO: 0.3 % — SIGNIFICANT CHANGE UP (ref 0–1.5)
LYMPHOCYTES # BLD AUTO: 2.23 K/UL — SIGNIFICANT CHANGE UP (ref 1–3.3)
LYMPHOCYTES # BLD AUTO: 33.1 % — SIGNIFICANT CHANGE UP (ref 13–44)
MCHC RBC-ENTMCNC: 30 PG — SIGNIFICANT CHANGE UP (ref 27–34)
MCHC RBC-ENTMCNC: 32.1 GM/DL — SIGNIFICANT CHANGE UP (ref 32–36)
MCV RBC AUTO: 93.5 FL — SIGNIFICANT CHANGE UP (ref 80–100)
MONOCYTES # BLD AUTO: 0.86 K/UL — SIGNIFICANT CHANGE UP (ref 0–0.9)
MONOCYTES NFR BLD AUTO: 12.8 % — SIGNIFICANT CHANGE UP (ref 2–14)
NEUTROPHILS # BLD AUTO: 3.46 K/UL — SIGNIFICANT CHANGE UP (ref 1.8–7.4)
NEUTROPHILS NFR BLD AUTO: 51.3 % — SIGNIFICANT CHANGE UP (ref 43–77)
PLATELET # BLD AUTO: 239 K/UL — SIGNIFICANT CHANGE UP (ref 150–400)
POTASSIUM SERPL-MCNC: 4.1 MMOL/L — SIGNIFICANT CHANGE UP (ref 3.5–5.3)
POTASSIUM SERPL-SCNC: 4.1 MMOL/L — SIGNIFICANT CHANGE UP (ref 3.5–5.3)
RBC # BLD: 3.83 M/UL — LOW (ref 4.2–5.8)
RBC # FLD: 12.7 % — SIGNIFICANT CHANGE UP (ref 10.3–14.5)
SODIUM SERPL-SCNC: 141 MMOL/L — SIGNIFICANT CHANGE UP (ref 135–145)
WBC # BLD: 6.74 K/UL — SIGNIFICANT CHANGE UP (ref 3.8–10.5)
WBC # FLD AUTO: 6.74 K/UL — SIGNIFICANT CHANGE UP (ref 3.8–10.5)

## 2020-12-06 PROCEDURE — 96374 THER/PROPH/DIAG INJ IV PUSH: CPT

## 2020-12-06 PROCEDURE — 85025 COMPLETE CBC W/AUTO DIFF WBC: CPT

## 2020-12-06 PROCEDURE — 99285 EMERGENCY DEPT VISIT HI MDM: CPT | Mod: 25

## 2020-12-06 PROCEDURE — 0225U NFCT DS DNA&RNA 21 SARSCOV2: CPT

## 2020-12-06 PROCEDURE — 85610 PROTHROMBIN TIME: CPT

## 2020-12-06 PROCEDURE — 97167 OT EVAL HIGH COMPLEX 60 MIN: CPT

## 2020-12-06 PROCEDURE — 90715 TDAP VACCINE 7 YRS/> IM: CPT

## 2020-12-06 PROCEDURE — 85730 THROMBOPLASTIN TIME PARTIAL: CPT

## 2020-12-06 PROCEDURE — 80048 BASIC METABOLIC PNL TOTAL CA: CPT

## 2020-12-06 PROCEDURE — 96375 TX/PRO/DX INJ NEW DRUG ADDON: CPT

## 2020-12-06 PROCEDURE — C1713: CPT

## 2020-12-06 PROCEDURE — 97163 PT EVAL HIGH COMPLEX 45 MIN: CPT

## 2020-12-06 PROCEDURE — 73130 X-RAY EXAM OF HAND: CPT

## 2020-12-06 PROCEDURE — 80053 COMPREHEN METABOLIC PANEL: CPT

## 2020-12-06 PROCEDURE — 36415 COLL VENOUS BLD VENIPUNCTURE: CPT

## 2020-12-06 PROCEDURE — 90471 IMMUNIZATION ADMIN: CPT

## 2020-12-06 PROCEDURE — 86900 BLOOD TYPING SEROLOGIC ABO: CPT

## 2020-12-06 PROCEDURE — C1889: CPT

## 2020-12-06 PROCEDURE — 86901 BLOOD TYPING SEROLOGIC RH(D): CPT

## 2020-12-06 PROCEDURE — 86850 RBC ANTIBODY SCREEN: CPT

## 2020-12-06 RX ORDER — SENNOSIDES/DOCUSATE SODIUM 8.6MG-50MG
2 TABLET ORAL
Qty: 30 | Refills: 0
Start: 2020-12-06 | End: 2020-12-20

## 2020-12-06 RX ORDER — HYDROMORPHONE HYDROCHLORIDE 2 MG/ML
1 INJECTION INTRAMUSCULAR; INTRAVENOUS; SUBCUTANEOUS
Qty: 40 | Refills: 0
Start: 2020-12-06 | End: 2020-12-12

## 2020-12-06 RX ORDER — CEPHALEXIN 500 MG
1 CAPSULE ORAL
Qty: 21 | Refills: 0
Start: 2020-12-06 | End: 2020-12-15

## 2020-12-06 RX ORDER — KETOROLAC TROMETHAMINE 30 MG/ML
30 SYRINGE (ML) INJECTION ONCE
Refills: 0 | Status: DISCONTINUED | OUTPATIENT
Start: 2020-12-06 | End: 2020-12-06

## 2020-12-06 RX ADMIN — Medication 975 MILLIGRAM(S): at 05:18

## 2020-12-06 RX ADMIN — SODIUM CHLORIDE 150 MILLILITER(S): 9 INJECTION, SOLUTION INTRAVENOUS at 05:17

## 2020-12-06 RX ADMIN — HYDROMORPHONE HYDROCHLORIDE 4 MILLIGRAM(S): 2 INJECTION INTRAMUSCULAR; INTRAVENOUS; SUBCUTANEOUS at 12:24

## 2020-12-06 RX ADMIN — Medication 100 MILLIGRAM(S): at 08:38

## 2020-12-06 RX ADMIN — Medication 975 MILLIGRAM(S): at 13:19

## 2020-12-06 RX ADMIN — HYDROMORPHONE HYDROCHLORIDE 0.5 MILLIGRAM(S): 2 INJECTION INTRAMUSCULAR; INTRAVENOUS; SUBCUTANEOUS at 14:23

## 2020-12-06 RX ADMIN — HYDROMORPHONE HYDROCHLORIDE 4 MILLIGRAM(S): 2 INJECTION INTRAMUSCULAR; INTRAVENOUS; SUBCUTANEOUS at 08:43

## 2020-12-06 RX ADMIN — HYDROMORPHONE HYDROCHLORIDE 0.5 MILLIGRAM(S): 2 INJECTION INTRAMUSCULAR; INTRAVENOUS; SUBCUTANEOUS at 11:23

## 2020-12-06 RX ADMIN — Medication 30 MILLIGRAM(S): at 13:19

## 2020-12-06 RX ADMIN — HYDROMORPHONE HYDROCHLORIDE 4 MILLIGRAM(S): 2 INJECTION INTRAMUSCULAR; INTRAVENOUS; SUBCUTANEOUS at 16:49

## 2020-12-06 NOTE — DISCHARGE NOTE PROVIDER - NSDCMRMEDTOKEN_GEN_ALL_CORE_FT
Dilaudid 2 mg oral tablet: 1-2  tab(s) orally every 4 hours, As Needed for painMDD:7   Keflex 500 mg oral capsule: 1 cap(s) orally every 8 hours x 7 days - last dose on 12/14/20 MDD:3  naproxen 250 mg oral tablet: 1-2  tab(s) orally every 8 hours, As Needed   OT: OT  start 12/9/20  remove dressing for therapy  gentle ROM left hand/fingers  S/P extensor and flexor tendon repairs left hand/orif 2nd and 3rd prox phalanx   Senna S 50 mg-8.6 mg oral tablet: 2 tab(s) orally once a day (at bedtime)

## 2020-12-06 NOTE — DISCHARGE NOTE PROVIDER - NSDCFUADDINST_GEN_ALL_CORE_FT
Keep dressing dry and intact  May remove dressing and splint at OT  OT start on 12/9, prescription given, [facility], call for appointment  Keflex antibiotic  Naproxen antiinflammatory and dilaudid pain meds as prescribed  F/U with Dr Weinberg 12/14 Keep dressing dry and intact  No weightbearing left hand  Elevate left hand  May remove dressing and splint at OT  OT start on 12/9, prescription given, [facility], call for appointment  Keflex antibiotic  Naproxen antiinflammatory and dilaudid pain meds as prescribed  F/U with Dr Weinberg 12/14 Keep dressing dry and intact  No weightbearing left hand  Elevate left hand  May remove dressing and splint at OT  OT start on 12/9, prescription given, [facility], call for appointment [phone]  Keflex antibiotic  Naproxen antiinflammatory and dilaudid pain meds as prescribed  F/U with Dr Weinberg 12/14

## 2020-12-06 NOTE — PROGRESS NOTE ADULT - ATTENDING COMMENTS
Saw pt.  Examined the left hand wound.  Pt is doing well. Happy with result so far.  May d/c with abx, pain meds, and start OT in 2-3 days.  Will follow up on 12/14/20 for suture removal.
Note reviewed.  Just discussed with PA   Agree with the plan.

## 2020-12-06 NOTE — DISCHARGE NOTE NURSING/CASE MANAGEMENT/SOCIAL WORK - NSDCFUADDAPPT_GEN_ALL_CORE_FT
Follow up: Dr Weinberg [place] office on 12/14/20  [address] [place] [id number]    OT 12/9  [phone]

## 2020-12-06 NOTE — DISCHARGE NOTE PROVIDER - NSDCFUADDAPPT_GEN_ALL_CORE_FT
Follow up: Dr Weinberg [place] office on 12/14/20  [address] [place], [place] [id number]   Follow up: Dr Weinberg [place] office on 12/14/20  [address] [place] [id number]    OT 12/9  [phone]

## 2020-12-06 NOTE — DISCHARGE NOTE PROVIDER - CARE PROVIDER_API CALL
Clifford Weinberg)  Orthopaedic Surgery  [address] [place] [address], [address]  [place] [address]  Phone: [phone]  Fax: [phone]  Follow Up Time:

## 2020-12-06 NOTE — DISCHARGE NOTE PROVIDER - HOSPITAL COURSE
The patient presented to the ER with extensive wounds to left hand following a saw injury with multiple tendon lacerations and open fracture. On 12/5/20, the patient underwent extensor and flexor tendon repairs, open reduction internal fixation of the 2nd and 3rd proximal phalanx with complex wound closure without complications. The patient received antibiotics consistent with SCIP guidelines. Post-operatively, the patient was seen by medicine and PT. The patient received SCDs for DVTP. The patient received pain medications per orthopedic pain managment protocol and the pain was appropriately controlled. Patient was evaluated by PT. The patient was NON-weight bearing. The patient did not have any post-operative medical complications. The patient was discharged in stable condition.

## 2020-12-06 NOTE — DISCHARGE NOTE PROVIDER - NSDCCPCAREPLAN_GEN_ALL_CORE_FT
PRINCIPAL DISCHARGE DIAGNOSIS  Diagnosis: Hand laceration involving tendon, left, initial encounter  Assessment and Plan of Treatment:

## 2020-12-06 NOTE — DISCHARGE NOTE NURSING/CASE MANAGEMENT/SOCIAL WORK - PATIENT PORTAL LINK FT
You can access the FollowMyHealth Patient Portal offered by [facility] by registering at the following website: [email or web address]. By joining [employer]’s FollowMyHealth portal, you will also be able to view your health information using other applications (apps) compatible with our system.

## 2020-12-06 NOTE — PROGRESS NOTE ADULT - SUBJECTIVE AND OBJECTIVE BOX
ANA PATEL    [id number]    History:  The patient is status post left hand extensor tendon/flexor tendon repairs and orif prox phalanx 2nd and 3rd digits with wound closure, POD # 1. Patient is doing well. The patient's pain is controlled using the prescribed pain medications. The patient is participating in physical therapy.  No new complaints. No acute motor or sensory changes are reported.    Vital Signs Last 24 Hrs  T(C): 36.7 (06 Dec 2020 04:49), Max: 37.2 (05 Dec 2020 15:24)  T(F): 98 (06 Dec 2020 04:49), Max: 98.9 (05 Dec 2020 15:24)  HR: 86 (06 Dec 2020 04:49) (86 - 101)  BP: 117/72 (06 Dec 2020 04:49) (117/70 - 153/85)  BP(mean): --  RR: 18 (06 Dec 2020 04:49) (18 - 19)  SpO2: 100% (06 Dec 2020 04:49) (95% - 100%)  I&O's Summary                            11.5   6.74  )-----------( 239      ( 06 Dec 2020 08:48 )             35.8     12-06    141  |  108<H>  |  13.0  ----------------------------<  92  4.1   |  26.0  |  0.70    Ca    7.9<L>      06 Dec 2020 08:48    TPro  6.7  /  Alb  3.9  /  TBili  0.6  /  DBili  x   /  AST  49<H>  /  ALT  49<H>  /  AlkPhos  57  12-04      MEDICATIONS  (STANDING):  acetaminophen   Tablet .. 975 milliGRAM(s) Oral every 8 hours  ceFAZolin   IVPB 2000 milliGRAM(s) IV Intermittent every 8 hours  lactated ringers. 1000 milliLiter(s) (150 mL/Hr) IV Continuous <Continuous>  senna 2 Tablet(s) Oral at bedtime    MEDICATIONS  (PRN):  aluminum hydroxide/magnesium hydroxide/simethicone Suspension 30 milliLiter(s) Oral four times a day PRN Indigestion  HYDROmorphone   Tablet 4 milliGRAM(s) Oral every 3 hours PRN Severe Pain (7 - 10)  HYDROmorphone  Injectable 0.5 milliGRAM(s) IV Push every 3 hours PRN breakthrough  magnesium hydroxide Suspension 30 milliLiter(s) Oral daily PRN Constipation  ondansetron Injectable 4 milliGRAM(s) IV Push every 6 hours PRN Nausea and/or Vomiting  oxyCODONE    IR 10 milliGRAM(s) Oral every 3 hours PRN Moderate Pain (4 - 6)  oxyCODONE    IR 5 milliGRAM(s) Oral every 3 hours PRN Mild Pain (1 - 3)      Physical exam: Lying in bed in NAD, awake and alert  Left hand- splint and dressings removed and seen with Dr Weinberg at bedside. Sutures intact volar hand and digits 2-4. No active dc. Some macerating of skin surrounding wound on volar hand 2/3 mcp.  No purulence. Brisk cap refill. Good color. Limited motion of fingers but intact. Sensation intact distally, but diminished at 3rd digit on radial side. No cyanosis.    Primary Orthopedic Assessment:  • S/P extensor and flexor tendon repairs, orif 2nd and 3rd prox phalanx, and complex wound closures left hand, POD#1    Plan:   -dsg changed left hand- dry xerform applied to wounds, not on macerated skin. Gauze, webril and dorsal splint applied with ace wrap  • DVT prophylaxis as prescribed, including use of compression devices and ankle pumps  • Continue physical therapy  • Non-weight bearing of the splinted extremity  • Continue splint as applied  • Elevation of the splinted extremity  • Pain control as clinically indicated- NSAIDs/narcotic  • Incentive spirometry encouraged  • Discharge planning – anticipated discharge is Home today, with po keflex  -start OT outpt on wednesday- script given to pt  -F/U with Dr Weinberg in office 12/14

## 2024-10-02 NOTE — OCCUPATIONAL THERAPY INITIAL EVALUATION ADULT - ANTICIPATED DISCHARGE DISPOSITION, OT EVAL
(4) walks frequently Home with assist as needed.  Outpatient OT - hand therapy once surgeon approves for ROM, therex.

## 2025-04-02 NOTE — ED ADULT NURSE NOTE - OBJECTIVE STATEMENT
Tardive dyskinesia
Pt is A&OX4, presents to ED via ambulance in a great deal of pain. Pt states he was at work using a table saw and put his hand underneath it while it was on. Pt has severe lacerations noted to the left hand. Hand assessed with ortho PA at bedside. Bleeding noted to hand with pulsating. Bleeding controlled with gauze. Pt has full ROM and sensation. As per PA pt likely to go to OR. Pt medicated with pain as per orders. Reports relief of pain since arriving. Will continue to monitor.